# Patient Record
Sex: MALE | Race: WHITE
[De-identification: names, ages, dates, MRNs, and addresses within clinical notes are randomized per-mention and may not be internally consistent; named-entity substitution may affect disease eponyms.]

---

## 2017-02-10 ENCOUNTER — HOSPITAL ENCOUNTER (OUTPATIENT)
Dept: HOSPITAL 39 - LAB.O | Age: 42
Discharge: HOME | End: 2017-02-10
Attending: NURSE PRACTITIONER
Payer: MEDICARE

## 2017-02-10 DIAGNOSIS — R53.1: Primary | ICD-10-CM

## 2017-02-10 DIAGNOSIS — R20.2: ICD-10-CM

## 2017-02-10 NOTE — CT
EXAM DESCRIPTION: 



CT Head



CLINICAL HISTORY: 



TINGLING SENSATION. R20.2



COMPARISON: 



July 1, 2010



TECHNIQUE: 



Contiguous axial images of the brain were obtained without the

administration of intravenous contrast.



FINDINGS: 



There is no acute intracranial hemorrhage or mass effect.

Ventricular system is within normal limits. There is adequate

gray-white matter differentiation. There is no skull fracture.

There is evidence of prior left optic globe surgery. The

visualized paranasal sinuses and mastoid air cells are within

normal limits.



IMPRESSION: 



No acute intracranial abnormalities.



Electronically signed by:  Ozzy Escamilla MD  2/10/2017 4:49 PM

CST

## 2017-03-16 ENCOUNTER — HOSPITAL ENCOUNTER (OUTPATIENT)
Dept: HOSPITAL 39 - CT | Age: 42
Discharge: HOME | End: 2017-03-16
Payer: SELF-PAY

## 2017-03-16 DIAGNOSIS — Z98.890: ICD-10-CM

## 2017-03-16 DIAGNOSIS — L23.0: Primary | ICD-10-CM

## 2017-03-16 DIAGNOSIS — Z87.19: ICD-10-CM

## 2017-03-16 NOTE — CT
Study: CT abdomen and pelvis. 



Indication: POST HERNIA REPAIR



Technique: Venous  phase CT imaging of the abdomen and pelvis

obtained after intravenous administration of contrast.



Comparison: October 1, 2014.



Findings:



Lung bases hyperexpanded. Inferior heart, liver, gallbladder,

pancreas, spleen, adrenal glands, left kidney, bladder, prostate

unremarkable. Small right renal cyst inferiorly.



Mild colonic diverticulosis. Appendix not visualized. Stomach and

small bowel unremarkable. No free fluid. No free air. No

pathologically enlarged abdominal or pelvic lymphadenopathy.

Atherosclerosis aorta. No acute osseous abnormality. 



Tiny fat-containing umbilical hernia. No additional anterior

abdominal wall hernia is identified.



Impression:



Tiny fat-containing umbilical hernia.



Atherosclerosis.



Colonic diverticulosis.



Additional findings as above.



Electronically signed by:  Law Rodarte MD  3/16/2017 12:17 PM

CDT

## 2018-01-09 ENCOUNTER — HOSPITAL ENCOUNTER (INPATIENT)
Dept: HOSPITAL 39 - ER | Age: 43
LOS: 1 days | Discharge: ANOTHER HOSPITAL | DRG: 175 | End: 2018-01-10
Attending: NURSE PRACTITIONER | Admitting: NURSE PRACTITIONER
Payer: MEDICARE

## 2018-01-09 DIAGNOSIS — I26.99: Primary | ICD-10-CM

## 2018-01-09 DIAGNOSIS — D72.829: ICD-10-CM

## 2018-01-09 DIAGNOSIS — Z86.14: ICD-10-CM

## 2018-01-09 DIAGNOSIS — R74.8: ICD-10-CM

## 2018-01-09 DIAGNOSIS — K21.9: ICD-10-CM

## 2018-01-09 DIAGNOSIS — R65.21: ICD-10-CM

## 2018-01-09 DIAGNOSIS — I82.622: ICD-10-CM

## 2018-01-09 DIAGNOSIS — I95.9: ICD-10-CM

## 2018-01-09 DIAGNOSIS — T82.868A: ICD-10-CM

## 2018-01-09 DIAGNOSIS — A41.9: ICD-10-CM

## 2018-01-09 DIAGNOSIS — Z88.3: ICD-10-CM

## 2018-01-09 DIAGNOSIS — N28.9: ICD-10-CM

## 2018-01-09 DIAGNOSIS — Z90.01: ICD-10-CM
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
## 2018-01-09 DIAGNOSIS — F17.210: ICD-10-CM

## 2018-01-09 DIAGNOSIS — T44.7X5A: ICD-10-CM

## 2018-01-09 DIAGNOSIS — I95.2: ICD-10-CM

## 2018-01-09 DIAGNOSIS — Z79.1: ICD-10-CM

## 2018-01-09 DIAGNOSIS — Z86.2: ICD-10-CM
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
## 2018-01-09 DIAGNOSIS — J18.9: ICD-10-CM
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
## 2018-01-09 DIAGNOSIS — Z79.899: ICD-10-CM
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
## 2018-01-09 DIAGNOSIS — I10: ICD-10-CM

## 2018-01-09 PROCEDURE — B32TYZZ COMPUTERIZED TOMOGRAPHY (CT SCAN) OF LEFT PULMONARY ARTERY USING OTHER CONTRAST: ICD-10-PCS

## 2018-01-09 PROCEDURE — B32SYZZ COMPUTERIZED TOMOGRAPHY (CT SCAN) OF RIGHT PULMONARY ARTERY USING OTHER CONTRAST: ICD-10-PCS

## 2018-01-09 RX ADMIN — MORPHINE SULFATE PRN MG: 10 INJECTION INTRAVENOUS at 20:49

## 2018-01-09 RX ADMIN — Medication PRN MLS/HR: at 20:37

## 2018-01-09 NOTE — RAD
EXAM DESCRIPTION: 



Chest,1 View



CLINICAL HISTORY: 



R rib pain



COMPARISON: 



November 1, 2009



IMPRESSION: 



Single AP portable upright view of the chest shows cardiac

silhouette and pulmonary vasculature to be within normal limits.

Lungs are normally aerated and clear.

No obvious pleural effusion or pneumothorax is seen.



Electronically signed by:  Adrián Gill MD  1/9/2018 3:26 PM CST

Workstation: [id number]

## 2018-01-09 NOTE — ED.PDOC
History of Present Illness





- General


Chief Complaint: General


Stated Complaint: Dizziness, sweats, headace, R back/chest discomfor


Time Seen by Provider: 18 15:01


Source: patient, family


Exam Limitations: no limitations





- History of Present Illness


Timing/Duration: 24 hours, constant


Severity: severe


Improving Factors: nothing


Worsening Factors: other - deep breathing


Associated Symptoms: chest pain, fever/chills


Allergies/Adverse Reactions: 


Allergies





Sulfamethoxazole w/Trimethoprim [From Bactrim] Allergy (Verified 18 14:48)


 








Home Medications: 


Ambulatory Orders





ALPRAZolam [Xanax] 0.25 mg PO TID 14 


Lisinopril 20 mg PO BID 14 


amLODIPine BESYLATE [Norvasc] 5 mg PO DAILY 14 


Ibuprofen [Advil] 200 mg PO PRN 10/01/14 


Lansoprazole [Prevacid] 30 mg PO PRN 10/01/14 











Review of Systems





- Review of Systems


Constitutional: States: chills, fever, weakness


EENTM: States: no symptoms reported


Respiratory: Denies: cough, short of breath, wheezing


Cardiology: States: chest pain


Gastrointestinal/Abdominal: States: nausea.  Denies: abdominal pain, vomiting


Genitourinary: States: no symptoms reported


Musculoskeletal: States: no symptoms reported.  Denies: joint pain, muscle pain


Skin: States: no symptoms reported.  Denies: rash


Neurological: States: no symptoms reported, anxiety, weakness


Endocrine: Denies: increased thirst, increased urine


Hematologic/Lymphatic: Denies: blood clots, easy bruising, swollen glands





Past Medical History (General)





- Patient Medical History


Hx Seizures: No


Hx Stroke: No


Hx Dementia: No


Hx Asthma: Yes


Hx of COPD: No


Hx Cardiac Disorders: No


Hx Congestive Heart Failure: No


Hx Pacemaker: No


Hx Hypertension: Yes


Hx Thyroid Disease: No


Hx Diabetes: No


Hx Gastroesophageal Reflux: Yes


Hx Renal Disease: No


Hx Cancer: No


Hx of HIV: No


Hx Hepatitis C: No


Hx MRSA: Yes


MRSA Source:: Wound


Surgical History: appendectomy, other - L eye enucleation, hernia repair





- Vaccination History


Hx Tetanus, Diphtheria Vaccination: Yes - WHILE BACK.


Hx Influenza Vaccination: No


Hx Pneumococcal Vaccination: No





- Social History


Hx Tobacco Use: Yes


Hx Chewing Tobacco Use: No


Hx Alcohol Use: No - NONE SINCE 


Hx Substance Use: No


Hx Substance Use Treatment: No


Hx Depression: No


Hx Physical Abuse: No


Hx Emotional Abuse: No


Hx Suspected Abuse: No





Family Medical History





- Family History


  ** Mother


Family History: Unknown


Living Status: 


Hx Family Cancer: Yes





Physical Exam





- Physical Exam


General Appearance: Alert, Obvious distress


Eye Exam: right normal - L eye is absent


Ears, Nose, Throat: hearing grossly normal, normal ENT inspection, normal 

pharynx


Neck: non-tender, full range of motion, supple


Respiratory: chest non-tender, no respiratory distress, rales - in R base, 

rhonchi


Cardiovascular/Chest: normal peripheral pulses, no murmur, tachycardia





Progress





- EKG/XRAY/CT


CT Ordered: Yes


CT Interpretation Call Back: Yes





Departure





- Departure


Clinical Impression: 


 Pulmonary embolism on right





Time of Disposition: 17:20


Disposition: Admit Patient


Condition: Good


Departure Forms:  ED Discharge - Pt. Copy, Patient Portal Self Enrollment


Referrals: 


Aldo Stearns III, MD [Primary Care Provider] - 1-2 Weeks


Home Medications: 


Ambulatory Orders





ALPRAZolam [Xanax] 0.25 mg PO TID 14 


Lisinopril 20 mg PO BID 14 


amLODIPine BESYLATE [Norvasc] 5 mg PO DAILY 14 


Ibuprofen [Advil] 200 mg PO PRN 10/01/14 


Lansoprazole [Prevacid] 30 mg PO PRN 10/01/14

## 2018-01-09 NOTE — CT
PROCEDURE:  CTA Chest right pleuritic chest pain after PICC line

placement



CLINICAL HISTORY:  42 years  Male  R pleuritic chest pain s/p

PICC line



COMPARISON:  None.



TECHNIQUE: Contiguous axial images were obtained through the

chest during the infusion of IV contrast. Reformatted images

obtained. MIP reformatted images obtained.   



This exam was performed according to our department optimization

program which includes automated exposure control, adjustment of

the mA and/or kv according to patient size and/or use of

iterative reconstruction technique.



FINDINGS:



There is embolus in right lower lobe segmental and subsegmental

branches. Motion artifact limits evaluation. No evidence of

embolus in the main pulmonary artery or the right or left main

branches.



On the thin section images there are central filling defects in

multiple vessels however this appears to be artifactual as this

is in both pulmonary arteries and pulmonary veins. The

possibility of some additional embolus to the right middle lobe

of the left lung base cannot be excluded on the basis of this

exam however



The pulmonary trunk is similar in caliber to the aorta. No

significant reflux of contrast is noted into the IVC.



There is minimal straightening of the interventricular septum,

however this does not appear significantly altered as compared to

the previous examination.



Mild emphysematous change.

Infiltrate in the right lower lobe versus developing area of

infarct.

No additional evidence of embolus is noted.

No significant mediastinal adenopathy.

There is a small enlarged axillary lymph node on the left with

short axis measurement of 1.3 cm.



IMPRESSION: Segmental and subsegmental pulmonary emboli to the

right lower lobe. Dr. Arreaga was called and notified of the

findings at 5:02 PM Central time.



There is adjacent wedge-shaped infiltrate versus developing

infarct in the right lung base



Additional images are limited secondary to motion artifact. There

are some central filling defects on the thin section images but

this is also noted in the pulmonary veins suggesting artifact.

The possibility of additional areas of embolus to the right

middle lobe or left lung base is not excluded however



Minimal straightening of the interventricular septum which

appears similar to the previous study. No overt signs of right

heart strain are noted



Enlarged hilar lymph node on the left. Findings are of uncertain

clinical significance. Recommend follow-up







Electronically signed by:  Doretha Johnson  1/9/2018 5:27 PM CST

Workstation: [id number]
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
## 2018-01-10 VITALS — SYSTOLIC BLOOD PRESSURE: 91 MMHG | DIASTOLIC BLOOD PRESSURE: 62 MMHG | TEMPERATURE: 97 F

## 2018-01-10 VITALS — OXYGEN SATURATION: 97 %

## 2018-01-10 RX ADMIN — ENOXAPARIN SODIUM SCH MG: 100 INJECTION, SOLUTION INTRAVENOUS; SUBCUTANEOUS at 17:05

## 2018-01-10 RX ADMIN — Medication PRN MLS/HR: at 15:54

## 2018-01-10 RX ADMIN — ENOXAPARIN SODIUM SCH MG: 100 INJECTION, SOLUTION INTRAVENOUS; SUBCUTANEOUS at 05:31

## 2018-01-10 RX ADMIN — MORPHINE SULFATE PRN MG: 10 INJECTION INTRAVENOUS at 00:28

## 2018-01-10 RX ADMIN — Medication PRN MLS/HR: at 05:33

## 2018-01-10 NOTE — US
EXAM DESCRIPTION: 



Venous,Lower Extremity LT



CLINICAL HISTORY: 



PE, find source of embolism



COMPARISON: 



None Available.



TECHNIQUE: 



Left lower extremity venous grayscale, spectral, and color

Doppler sonographic images. 



FINDINGS: 



There is no DVT identified.

There is normal color flow observed with good flow augmentation.

All deep veins compress normally.





IMPRESSION: 



Negative for DVT



Electronically signed by:  Chapo Irene MD  1/10/2018 3:39 PM CST

Workstation: [id number]

## 2018-01-10 NOTE — US
EXAM DESCRIPTION: 



Venous,Upper Extremity LT



CLINICAL HISTORY: 



PE, find source of embolism



COMPARISON: 



None Available.



TECHNIQUE: 



Left upper extremity venous grayscale, spectral, and color

Doppler sonographic images. 



FINDINGS: 



Nonocclusive deep venous thrombosis is demonstrated in the left

basilic vein. The internal jugular, subclavian, radial, brachial,

and ulnar veins demonstrate normal flow with no evidence of DVT.



IMPRESSION: 



DVT in the left basilic vein in this patient with known pulmonary

emboli.



Electronically signed by:  Chapo Irene MD  1/10/2018 2:48 PM CST

Workstation: [id number]

## 2018-01-10 NOTE — US
EXAM DESCRIPTION: 



Venous,Lower Extremity LT



CLINICAL HISTORY: 



PE, find source of embolism



COMPARISON: 



None Available.



TECHNIQUE: 



Left lower extremity venous grayscale, spectral, and color

Doppler sonographic images. 



FINDINGS: 



There is no DVT identified.

There is normal color flow observed with good flow augmentation.

All deep veins compress normally.





IMPRESSION: 



Negative for DVT



Electronically signed by:  Chapo Irene MD  1/10/2018 3:39 PM CST

Workstation: [id number]

## 2018-01-10 NOTE — PN
SUPERVISING PHYSICIAN:  Jese Christianson MD



DATE:  01/10/18



SUBJECTIVE: This is a 42-year-old male who was admitted last night with a 
pulmonary embolism.  Overnight, I was called due to the fact that he had 
significant tachycardia with pleuritic chest pain.  I did order an EKG as well 
as cardiac enzymes.  His troponin was negative and EKG showed sinus 
tachycardia.  However, there was some ST elevation in the inferolateral leads.  
He was not having substernal chest pain.  His pain was pleuritic in nature.  I 
did give him some morphine which alleviated some of the pain and also gave him 
2.5 mg of IV Lopressor which helped with his heart rate.  We repeated the 
troponin a couple of hours later and it continued to be negative.  So far, his 
oxygenation has been acceptable along with his blood pressure.  He is not 
showing any kind of hemodynamic instability at this time.  He states the pain 
is temporarily improved with morphine, but it does not sustain.



OBJECTIVE:  VITAL SIGNS:  Blood pressure 143/87.  Heart rate 137.  Respiratory 
rate 24.  Temperature 96.8.  Oxygen saturation 96%.

GENERAL:  Mr. Gillette is a 42-year-old male patient who is in moderate distress 
secondary to pain.

HEENT:  Normocephalic, atraumatic.  The right Pupil is reactive.  The left eye 
is enucleated.  No nasal drainage.  Moist buccal mucosa.

NECK: Supple.  Midline trachea.  No jugular venous distention.

CHEST:  Symmetrical with equal rise and fall of the chest with inspiration and 
expiration.  Lungs sounds are a little bit diminished in the bases, but 
otherwise clear to auscultation bilaterally.  

CARDIOVASCULAR:  Regular rate and rhythm, which is sinus tachycardia per the 
cardiac otherwise normal.  

ABDOMEN:  Soft.  Positive bowel sounds. 

GENITOURINARY:  Deferred.

EXTREMITIES: Lower extremities with no significant edema.  

NEUROLOGIC:  The patient is alert and oriented.



LABORATORY:  Labs are reviewed.  He has had an improvement in his white blood 
cell count at 12.6.  Hemoglobin 14.4, hematocrit 42.9, platelet count 137.  PT 
14.3, INR 1.27, PT-T 33.6.  Sodium 129, potassium 4.1, chloride 96, CO2 23, BUN 
10, creatinine 0.95, glucose 122, calcium 8.3.  Cardiac enzymes drawn this 
morning are negative with troponin less than 0.02.  



ASSESSMENT: 

1.  Acute pulmonary embolism.

2.  Leukocytosis.

3.  Tachycardia with ST elevation.

4.  Hypertension.

5.  Recent Staphylococcus aureus infection requiring IV antibiotics.

6.  History of thrombotic thrombocytopenic purpura (TTP).



PLAN:

1.  Regarding the pulmonary embolism, I am continuing the Lovenox at this time.
  I 

     have spoken with several specialists in [place], the first being Dr. Casey, 

     pulmonologist.  I discussed the case with him and he stated that as long 
as the 

     patient is hemodynamically stable and is not showing a significant 
decrease in 

     his oxygenation, he should be fine on the current management to include 
Lovenox.  

     I also spoke with the cardiologist, Dr. Hargrove.  I sent him the EKG which 
was 

     showing ST elevation.  I also informed him that the cardiac enzymes were 
negative.  

     He agreed that the patient was not likely having a myocardial infarction 
and there 

     were no reciprocal changes on EKG.  He was in agreement that if the patient
's 

     blood pressure could tolerate it, that a beta blocker could be given.  At 
that time, I 

     did order 2.5 mg of IV Lopressor again.  If his blood pressure maintains, 
I will likely 

     start him on p.o. Lopressor.  I also spoke with Dr. Dugan with 
hematology.  She 

     has seen him in the past for his TTP.  I discussed the case with her and 
she was in 

     agreement that Lovenox could be utilized and that he could be transitioned 
to either 

     Eliquis or Xarelto.  I have gone over to the clinic to get a starter pack 
for Xarelto and 

     we will likely start this tomorrow hoping that over the next 24 hours his 
heart rate and 

     pain control is better.  

2.  Leukocytosis is improved, but still there.  He did have that area on CT 
which was 

     either infarct or infiltrate.  Therefore, I will empirically place him on 
cefepime.  

3.  Regarding the tachycardia with ST elevation, as discussed above with Dr. Hargrove.  

     This is not a myocardial infarction and we will treat this with beta 
blockers and hopefully 

     his heart rate will improve with pain control as well as the beta blockers.

4.  The morphine is not adequately controlling his pain.  Therefore, I am going 
to change 

     this to Dilaudid.  I did test him with 1 mg of Dilaudid and it did have 
significantly better 

     results.

5.  I have already resumed his home medications as well.  



Dr. Christianson is the collaborating physician.



#[id number]
[facility]

## 2018-01-10 NOTE — PN
DATE:  01/10/18



CRITICAL CARE PROGRESS NOTE - START 1850, END TIME 2000



COLLABORATING PHYSICIAN:  Jese Christianson M.D.



SUBJECTIVE:  I was called to the patient's bedside due to hypotension.  The 
patient was diaphoretic with a systolic blood pressure of 78.  Upon arrival in 
the room, the patient is alert but clearly pale and not feeling well.  Heart 
rate is in the 80s.  He did receive Metoprolol about an hour and a half ago.  
He also spiked a fever of 102.4 at around 2:00 PM.  At that point he had blood 
cultures drawn  and I had already initiated Cefipime.  I ordered a bolus of 
lactated ringers stat at this time.  I discussed with him that he probably 
needed a transfer to [facility] due to the fact that his hypotension could 
be due to multiple things or multifactorial.  Given the fact that he ran a 
fever earlier, there is a concern for sepsis.  Additionally, his hypotension 
could be secondary to his pulmonary embolism.  There is the possibility also 
that he is sensitive to the Metoprolol that he got, however, he got IV doses of 
Metoprolol earlier without this dramatic of an effect.  I called [facility] in [place] for transfer.  I spoke with Dr. Mendoza and he is 
willing to accept Mr. Gillette at this time. With about 50% of the bolus infused
, his systolic is in the mid 80s.  I am ordering vasopressors if there is no 
canchola improvement in the blood pressure at this time.



OBJECTIVE:  Blood pressure 82/50, heart rate 82, respiratory rate 26, 
temperature now 96.4.  O2 saturation is 97%.  GENERAL:  Mr. Gillette is an 
obviously acutely ill male patient in moderate distress at this time.  HEENT: 
Head is normocephalic and atraumatic.  EYES: The right eye is reactive, the 
left is enucleated.  NOSE: No drainage.  THROAT: With moist mucosa.  NECK: 
Supple with midline trachea.  No jugular venous distention.  CHEST: Symmetrical 
with equal rise and fall of the chest with inspiration and expiration.  Lung 
sounds are a little bit diminished in the bases, but otherwise clear to 
auscultation bilaterally.  CARDIOVASCULAR: The patient has a regular rate and 
rhythm, normal S1 and S2.  ABDOMEN: Soft, positive bowel sounds.  Non-tender to 
palpation.  GENITOURINARY: Exam was deferred.  EXTREMITIES:  Lower extremities 
with no edema.  Pulses are about 1+.  Capillary refill is about 3 seconds.  
NEUROLOGIC: The patient is alert and oriented.



LABORATORY:  Labs and films are as stated earlier in the Progress Note done 
earlier today.  He did have an elevated white count of 12,600.  He had a left 
shift of 88.1.  Sodium 129, potassium 4.1, chloride 96, carbon dioxide 23, BUN 
10, creatinine 0.95, glucose 122, calcium 8.3.  Cardiac enzymes were negative.  
Cultures are still pending.  There are no positive results on that as of yet.



ASSESSMENT: 

1.   Symptomatic hypotension with concern for septic shock versus complication 
from

      a pulmonary embolism versus iatrogenic from the Metoprolol.

2.   Acute pulmonary embolism.

3.   Leukocytosis.

4.   Tachycardia with ST elevation with myocardial infarction ruled out.

5.   Hypertension history with current hypotension.

6.   Recent Staphylococcus aureus skin infection that he completed IV 
antibiotics for.

7.   Left basilic vein deep venous thrombosis secondary to a PICC line.

8.   History of TTP.



PLAN:

1.   As day before, we are bolusing fluids and treating as sepsis until proven 
otherwise.  

      I have arranged to transfer to [facility] under Dr. Mendoza.  I 
have been 

      at the bedside for 70 minutes with direct observation as well as giving 
orders and

      coordinating care to [facility].  Family is at the bedside, too, and 
have 

      been updated.  I am also going to order vasopressors in the event that IV 
bolus

      fluids are ineffective.



Critical Care time is 70 minutes spent in evaluation and management of the 
patient, coordination of care with the nursing staff as well as the [facility] physicians, chart review, , as well as documentation.



#[id number]
[facility]

## 2018-01-11 NOTE — DS
SUPERVISING PHYSICIAN:  Jese Christianson MD



DISCHARGE/TRANSFER SUMMARY



ADMISSION DIAGNOSIS:

1.  Acute pulmonary embolism.

2.  Leukocytosis.

3.  Tachycardia.

4.  History of hypertension.

5.  Recent Staphylococcus aureus infection requiring IV antibiotics.

6.  History of thrombotic thrombocytopenic purpura (TTP).



DISCHARGE DIAGNOSIS: 

1.  Acute pulmonary embolism.

2.  Symptomatic hypotension.

3.  Leukocytosis with possible right lower lobe pneumonia.

4.  Tachycardia with ST elevation, however, myocardial infarction has been

     ruled out.

5.  Recent Staphylococcus aureus infection requiring outpatient IV antibiotics.

6.  History of thrombotic thrombocytopenic purpura (TTP).



HOSPITAL COURSE: This is a 42-year-old male patient who came to the Emergency 
Room with dizziness and sweats, but mainly right sided pleuritic chest pain on 
the evening of 01/09/18.  He stated it had been going on for about 24 hours 
after a PICC line had been removed from his arm the day before.  The pain 
started about 30 minutes after the PICC line was taken out.  He came to the 
Emergency Room and had labs and films done including a CT angiogram of the 
chest which showed a pulmonary embolism in the right lower lobe segment and 
subsegmental branches.  There was no evidence of a pulmonary embolus in the 
main pulmonary arteries, however, adjacent to the embolism, there was a wedge-
shaped area which was interpreted as either infiltrate versus infarct.  
Additionally, he had an elevated white blood cell count of 14,000.  For these 
reasons, he was admitted to the hospital and placed on Lovenox.  At the time, 
his systolic blood pressure was acceptable along with his heart rate, which was 
initially in the 130s, but then improved to 112.  O2 saturations were 97% on 
room air.  Later on in the evening, the patient developed a significant right 
sided chest pain where he was actually complaining before along with a 
significant tachycardia.  The monitor was showing ST elevation.  This was early 
morning of 01/10/18.  I went to evaluate the patient and he was complaining of 
pleuritic type chest pain, no substernal chest pain.  He did have some 
shortness of breath.  Heart rate was greatly elevated at about 139 or 140.  I 
did a 12-lead EKG, which did show ST elevation in inferior and anterior leads.  
However, the troponin that was done was negative.  We repeated the troponin a 
couple of hours later and it was still negative.  His heart rate improved after 
2.5 mg of IV Lopressor and morphine was administered for pain control.  A third 
set of cardiac enzymes was drawn later on in the morning and it was negative as 
well.  On morning rounds, I did fax the EKG to Dr. Hargrove in [place] who 
agreed that this is not a myocardial infarction despite the fact that the 
electronic interpretation was saying it was and there were no reciprocal 
changes to suggest that the ST elevation that was showing up was secondary to 
myocardial infarction, plus the troponin was negative.  I also spoke with Dr. Casey in [place] regarding management of the pulmonary embolism and he 
was in agreement that Lovenox was sufficient given the patient had a normal 
blood pressure and O2 saturations were acceptable.  The last person I called 
was Dr. Dugan and I spoke with her because he does have a history of TTP.
  She agreed that we could continue the Lovenox and even transfer the patient 
over to Eliquis or Xarelto.  I kept him on the Lovenox for the day and then 
obtained a starer pack for Xarelto for him to start tomorrow.  I also 
empirically placed him on cefepime due to the fact I could not rule out that he 
did have a pneumonia although it was likely more of an infarct from the 
pulmonary embolism.  I ordered sonograms of the bilateral lower extremities as 
well as the left upper extremity.  The lower extremities did not show any DVT, 
however, the left upper extremity showed a left basilic vein DVT.  This was 
consistent with where his PICC line was.  About 2 o'clock in the afternoon, the 
patient developed a fever at 102.4.  At that time, I drew blood cultures on the 
patient and administered some Tylenol . The Tylenol did not really help with 
his fever and later on, I was called back regarding that.  At that point, I 
gave him 400 mg of Motrin.  Earlier in the morning when I did speak with Dr. Hargrove about his heart rate, he said he was not opposed to utilizing metoprolol 
to control his heart rate.  I gave him another dose of 2.5 mg of IV Lopressor 
and his heart rate did improve a bit.  At that point, I did order some p.o. 
Lopressor to be given b.i.d. 25 mg.  The Lopressor was given about 5 o'clock in 
the afternoon.  His fever did get better.  However, I was called about 6:50 
informing me that the patient was hypotensive with systolic blood pressure of 
78 and the patient was diaphoretic.  I went to the bedside to evaluate the 
patient and he was diaphoretic and did have a systolic blood pressure in the 
70s.  I ordered a stat bolus of Lactated Ringers at that time.  His heart rate 
was a little bit better and was in the 80s.  With the administration of IV 
fluids, his blood pressure did improve to the 90s, however, at this point, the 
blood pressure being low had multiple potential causes.  There is a concern for 
sepsis due to the fever and leukocytosis.  He does have a history of MRSA skin 
infections as well as the potential for the right lower lobe pneumonia.  
Additionally, his pulmonary embolism could be causing hemodynamic instability 
and, finally, it could be iatrogenic from the Lopressor that was given.  However
, he did not have a dramatic effect from the IV Lopressor earlier in the day.  
At that time, I spoke with Dr. Christianson and he was in agreement that we should 
attempt to transfer the patient to [place].  I spoke with Dr. Mendoza 
regarding transferring the patient.  He was in agreement to accept the patient 
to be treated for severe sepsis and septic shock until ruled out otherwise.  I 
ordered a total of 2 liters of crystalloid fluids and also ordered 
norepinephrine to be started if the blood pressure did not improve.  The 
norepinephrine was not started prior to his departure in the ambulance, however
, I did give the norepinephrine to the ambulance staff so that they could 
utilize it if it was necessary.  I also spoke with Dr. Lainez at [facility] Emergency Room.  The patient was to stop there first 
and be evaluated prior to going up to one of the floors, either the ICU or PCU.
  This will depend on probably blood pressure at the time.  Therefore, the 
patient departed [facility] in critical condition.  Further 
care and treatment to be administered by [facility] team.



#[id number]
[facility]

## 2018-02-23 ENCOUNTER — HOSPITAL ENCOUNTER (OUTPATIENT)
Dept: HOSPITAL 39 - LAB.O | Age: 43
End: 2018-02-23
Attending: INTERNAL MEDICINE
Payer: MEDICARE

## 2018-02-23 DIAGNOSIS — D69.3: Primary | ICD-10-CM

## 2018-02-26 ENCOUNTER — HOSPITAL ENCOUNTER (OUTPATIENT)
Dept: HOSPITAL 39 - LAB.O | Age: 43
End: 2018-02-26
Attending: INTERNAL MEDICINE
Payer: MEDICARE

## 2018-02-26 DIAGNOSIS — D69.3: Primary | ICD-10-CM

## 2018-04-07 ENCOUNTER — HOSPITAL ENCOUNTER (OUTPATIENT)
Dept: HOSPITAL 39 - GOCC | Age: 43
Discharge: HOME | End: 2018-04-07
Attending: INTERNAL MEDICINE
Payer: MEDICARE

## 2018-04-07 DIAGNOSIS — R78.81: Primary | ICD-10-CM

## 2018-04-08 ENCOUNTER — HOSPITAL ENCOUNTER (OUTPATIENT)
Dept: HOSPITAL 39 - GOCC | Age: 43
Discharge: HOME | End: 2018-04-08
Attending: INTERNAL MEDICINE
Payer: MEDICARE

## 2018-04-08 DIAGNOSIS — R78.81: Primary | ICD-10-CM

## 2018-04-12 ENCOUNTER — HOSPITAL ENCOUNTER (OUTPATIENT)
Dept: HOSPITAL 39 - LAB.O | Age: 43
End: 2018-04-12
Attending: INTERNAL MEDICINE
Payer: MEDICARE

## 2018-04-12 DIAGNOSIS — D69.3: Primary | ICD-10-CM

## 2018-09-17 ENCOUNTER — HOSPITAL ENCOUNTER (OUTPATIENT)
Dept: HOSPITAL 39 - LAB.O | Age: 43
End: 2018-09-17
Attending: INTERNAL MEDICINE
Payer: MEDICARE

## 2018-09-17 DIAGNOSIS — B95.62: ICD-10-CM

## 2018-09-17 DIAGNOSIS — L03.211: ICD-10-CM

## 2018-09-17 DIAGNOSIS — R89.5: Primary | ICD-10-CM

## 2018-09-19 ENCOUNTER — HOSPITAL ENCOUNTER (OUTPATIENT)
Dept: HOSPITAL 39 - LAB.O | Age: 43
End: 2018-09-19
Attending: INTERNAL MEDICINE
Payer: MEDICARE

## 2018-09-19 DIAGNOSIS — R89.5: Primary | ICD-10-CM

## 2018-09-19 DIAGNOSIS — B95.62: ICD-10-CM

## 2018-09-19 DIAGNOSIS — L03.211: ICD-10-CM

## 2018-09-21 ENCOUNTER — HOSPITAL ENCOUNTER (OUTPATIENT)
Dept: HOSPITAL 39 - LAB.O | Age: 43
End: 2018-09-21
Attending: INTERNAL MEDICINE
Payer: MEDICARE

## 2018-09-21 DIAGNOSIS — R89.5: Primary | ICD-10-CM

## 2018-09-21 DIAGNOSIS — B95.62: ICD-10-CM

## 2018-09-21 DIAGNOSIS — L03.211: ICD-10-CM

## 2018-09-24 ENCOUNTER — HOSPITAL ENCOUNTER (OUTPATIENT)
Dept: HOSPITAL 39 - LAB | Age: 43
End: 2018-09-24
Attending: INTERNAL MEDICINE
Payer: MEDICARE

## 2018-09-24 DIAGNOSIS — B95.62: ICD-10-CM

## 2018-09-24 DIAGNOSIS — R89.5: Primary | ICD-10-CM

## 2018-09-24 DIAGNOSIS — L03.211: ICD-10-CM

## 2018-09-26 ENCOUNTER — HOSPITAL ENCOUNTER (OUTPATIENT)
Dept: HOSPITAL 39 - LAB.O | Age: 43
End: 2018-09-26
Attending: INTERNAL MEDICINE
Payer: MEDICARE

## 2018-09-26 DIAGNOSIS — B95.62: ICD-10-CM

## 2018-09-26 DIAGNOSIS — L03.211: ICD-10-CM

## 2018-09-26 DIAGNOSIS — R89.5: Primary | ICD-10-CM

## 2018-10-01 ENCOUNTER — HOSPITAL ENCOUNTER (OUTPATIENT)
Dept: HOSPITAL 39 - LAB.O | Age: 43
End: 2018-10-01
Attending: INTERNAL MEDICINE
Payer: MEDICARE

## 2018-10-01 DIAGNOSIS — L02.213: Primary | ICD-10-CM

## 2018-10-03 ENCOUNTER — HOSPITAL ENCOUNTER (OUTPATIENT)
Dept: HOSPITAL 39 - LAB.O | Age: 43
End: 2018-10-03
Attending: INTERNAL MEDICINE
Payer: MEDICARE

## 2018-10-03 DIAGNOSIS — L02.213: Primary | ICD-10-CM

## 2018-10-22 ENCOUNTER — HOSPITAL ENCOUNTER (OUTPATIENT)
Dept: HOSPITAL 39 - LAB.O | Age: 43
End: 2018-10-22
Attending: INTERNAL MEDICINE
Payer: MEDICARE

## 2018-10-22 DIAGNOSIS — L02.213: Primary | ICD-10-CM

## 2018-10-22 DIAGNOSIS — L02.92: ICD-10-CM

## 2018-10-31 ENCOUNTER — HOSPITAL ENCOUNTER (OUTPATIENT)
Dept: HOSPITAL 39 - LAB.O | Age: 43
End: 2018-10-31
Attending: INTERNAL MEDICINE
Payer: MEDICARE

## 2018-10-31 DIAGNOSIS — L02.13: Primary | ICD-10-CM

## 2018-10-31 DIAGNOSIS — L02.92: ICD-10-CM

## 2019-01-10 ENCOUNTER — HOSPITAL ENCOUNTER (OUTPATIENT)
Dept: HOSPITAL 39 - ER | Age: 44
Setting detail: OBSERVATION
LOS: 2 days | Discharge: HOME | End: 2019-01-12
Attending: NURSE PRACTITIONER | Admitting: NURSE PRACTITIONER
Payer: MEDICARE

## 2019-01-10 DIAGNOSIS — J45.909: ICD-10-CM

## 2019-01-10 DIAGNOSIS — F17.220: ICD-10-CM

## 2019-01-10 DIAGNOSIS — E87.2: ICD-10-CM

## 2019-01-10 DIAGNOSIS — Z86.73: ICD-10-CM

## 2019-01-10 DIAGNOSIS — Z79.899: ICD-10-CM

## 2019-01-10 DIAGNOSIS — L03.113: Primary | ICD-10-CM

## 2019-01-10 DIAGNOSIS — M79.621: ICD-10-CM

## 2019-01-10 DIAGNOSIS — Z88.6: ICD-10-CM

## 2019-01-10 DIAGNOSIS — E83.42: ICD-10-CM

## 2019-01-10 DIAGNOSIS — K21.9: ICD-10-CM

## 2019-01-10 DIAGNOSIS — R60.0: ICD-10-CM

## 2019-01-10 DIAGNOSIS — I48.0: ICD-10-CM

## 2019-01-10 DIAGNOSIS — Z88.8: ICD-10-CM

## 2019-01-10 DIAGNOSIS — I10: ICD-10-CM

## 2019-01-10 DIAGNOSIS — Z88.2: ICD-10-CM

## 2019-01-10 DIAGNOSIS — D69.3: ICD-10-CM

## 2019-01-10 DIAGNOSIS — Z86.14: ICD-10-CM

## 2019-01-10 DIAGNOSIS — Z88.1: ICD-10-CM

## 2019-01-10 DIAGNOSIS — Z90.01: ICD-10-CM

## 2019-01-10 DIAGNOSIS — F41.1: ICD-10-CM

## 2019-01-10 DIAGNOSIS — Z23: ICD-10-CM

## 2019-01-10 PROCEDURE — 86140 C-REACTIVE PROTEIN: CPT

## 2019-01-10 PROCEDURE — 85025 COMPLETE CBC W/AUTO DIFF WBC: CPT

## 2019-01-10 PROCEDURE — 96365 THER/PROPH/DIAG IV INF INIT: CPT

## 2019-01-10 PROCEDURE — 80053 COMPREHEN METABOLIC PANEL: CPT

## 2019-01-10 PROCEDURE — 99284 EMERGENCY DEPT VISIT MOD MDM: CPT

## 2019-01-10 PROCEDURE — 90714 TD VACC NO PRESV 7 YRS+ IM: CPT

## 2019-01-10 PROCEDURE — 87040 BLOOD CULTURE FOR BACTERIA: CPT

## 2019-01-10 PROCEDURE — 96367 TX/PROPH/DG ADDL SEQ IV INF: CPT

## 2019-01-10 PROCEDURE — 36415 COLL VENOUS BLD VENIPUNCTURE: CPT

## 2019-01-10 PROCEDURE — 80202 ASSAY OF VANCOMYCIN: CPT

## 2019-01-10 PROCEDURE — 83735 ASSAY OF MAGNESIUM: CPT

## 2019-01-10 PROCEDURE — 96366 THER/PROPH/DIAG IV INF ADDON: CPT

## 2019-01-10 PROCEDURE — 94640 AIRWAY INHALATION TREATMENT: CPT

## 2019-01-10 PROCEDURE — 96375 TX/PRO/DX INJ NEW DRUG ADDON: CPT

## 2019-01-10 PROCEDURE — 96361 HYDRATE IV INFUSION ADD-ON: CPT

## 2019-01-10 PROCEDURE — 94760 N-INVAS EAR/PLS OXIMETRY 1: CPT

## 2019-01-10 PROCEDURE — 99406 BEHAV CHNG SMOKING 3-10 MIN: CPT

## 2019-01-10 PROCEDURE — 90471 IMMUNIZATION ADMIN: CPT

## 2019-01-10 PROCEDURE — 96376 TX/PRO/DX INJ SAME DRUG ADON: CPT

## 2019-01-10 PROCEDURE — 85651 RBC SED RATE NONAUTOMATED: CPT

## 2019-01-10 PROCEDURE — 83605 ASSAY OF LACTIC ACID: CPT

## 2019-01-10 PROCEDURE — 76881 US COMPL JOINT R-T W/IMG: CPT

## 2019-01-10 PROCEDURE — 96372 THER/PROPH/DIAG INJ SC/IM: CPT

## 2019-01-10 RX ADMIN — VANCOMYCIN HYDROCHLORIDE SCH MLS/HR: 500 INJECTION, POWDER, LYOPHILIZED, FOR SOLUTION INTRAVENOUS at 20:25

## 2019-01-10 RX ADMIN — ENOXAPARIN SODIUM SCH MG: 40 INJECTION, SOLUTION INTRAVENOUS; SUBCUTANEOUS at 20:32

## 2019-01-10 RX ADMIN — HYDROCODONE BITARTRATE AND ACETAMINOPHEN PRN EA: 5; 325 TABLET ORAL at 20:33

## 2019-01-10 RX ADMIN — SODIUM CHLORIDE AND POTASSIUM CHLORIDE PRN MLS/HR: 4.5; 1.49 INJECTION, SOLUTION INTRAVENOUS at 16:06

## 2019-01-10 RX ADMIN — LISINOPRIL SCH MG: 10 TABLET ORAL at 20:32

## 2019-01-10 NOTE — ED.PDOC
History of Present Illness





- General


Chief Complaint: Skin/Abrasion/Tear


Stated Complaint: R forearm/elbow discomfort


Time Seen by Provider: 01/10/19 11:20


Source: patient


Exam Limitations: no limitations





- History of Present Illness


Initial Comments: 





the patient's a 43-year-old  male presenting to the emergency room 

secondary to cellulitis of the right upper extremity starting last night.  No 

fevers.  No altered mental status.  No shortness of breath.  He does have a 

history of thrombotic cytopenic purpura.  He has seen infectious disease on 

multiple occasions secondary to multiple recurrent staph infections.  He 

normally receives IV vancomycin for the staph infections.  He does have a 

history of becoming septic very rapidly.  No evidence of sepsis currently. He is

pleasant and cooperative.


Timing/Duration: 24 hours


Severity: mild


Improving Factors: nothing


Worsening Factors: nothing


Associated Symptoms: denies symptoms


Allergies/Adverse Reactions: 


Allergies





Codeine Allergy (Unknown, Verified 18 14:50)


   


Meperidine Allergy (Unknown, Verified 18 14:50)


   


Pravastatin [From Pravachol] Allergy (Unknown, Verified 18 14:50)


   


Pregabalin [From Lyrica] Allergy (Unknown, Verified 18 14:50)


   


Ciprofloxacin [From Cipro] Allergy (Verified 01/10/18 04:15)


   


Daptomycin Allergy (Verified 01/10/18 04:15)


   


Sulfamethoxazole w/Trimethoprim [From Bactrim] Allergy (Verified 18 14:48)


   








Home Medications: 


Ambulatory Orders





ALPRAZolam [Xanax] 0.25 mg PO TID 14 


Lisinopril 20 mg PO BID 14 


amLODIPine BESYLATE [Norvasc] 5 mg PO DAILY 14 


Ibuprofen [Advil] 200 mg PO PRN 10/01/14 


Lansoprazole [Prevacid] 30 mg PO PRN 10/01/14 











Review of Systems





- Review of Systems


Constitutional: States: no symptoms reported


EENTM: States: no symptoms reported


Respiratory: States: no symptoms reported


Cardiology: States: no symptoms reported


Gastrointestinal/Abdominal: States: no symptoms reported


Genitourinary: States: no symptoms reported


Musculoskeletal: States: no symptoms reported


Skin: States: see HPI


Neurological: States: no symptoms reported


Endocrine: States: no symptoms reported


All other Systems: No Change from Baseline





Past Medical History (General)





- Patient Medical History


Hx Seizures: No


Hx Stroke: Yes - 


Hx Dementia: No


Hx Asthma: Yes


Hx of COPD: No


Hx Cardiac Disorders: No


Hx Congestive Heart Failure: No


Hx Pacemaker: No


Hx Hypertension: Yes


Hx Thyroid Disease: No


Hx Diabetes: No


Hx Gastroesophageal Reflux: Yes


Hx Renal Disease: No


Hx Cancer: Yes - ITP


Hx of HIV: No


Hx Hepatitis C: No


Hx MRSA: Yes - est 


MRSA Source:: Wound


Surgical History: appendectomy





- Vaccination History


Hx Tetanus, Diphtheria Vaccination: Yes - WHILE BACK.


Hx Influenza Vaccination: No


Hx Pneumococcal Vaccination: No





- Social History


Hx Tobacco Use: Yes


Hx Chewing Tobacco Use: No


Hx Alcohol Use: No


Hx Substance Use: No


Hx Substance Use Treatment: No


Hx Depression: No


Hx Physical Abuse: No


Hx Emotional Abuse: No


Hx Suspected Abuse: No





Family Medical History





- Family History


  ** Father


Living Status: 


Hx Family;Other: MI





  ** Mother


Family History: Unknown


Living Status: Still Living


Hx Family Cancer: Yes - Breast cancer





Physical Exam





- Physical Exam


General Appearance: Alert, Comfortable, No apparent distress


Eye Exam: bilateral other - the patient does have a disconjugate gaze


Ears, Nose, Throat: hearing grossly normal, normal pharynx - poor dentition


Neck: full range of motion, supple


Respiratory: lungs clear, normal breath sounds, no respiratory distress, no 

accessory muscle use


Cardiovascular/Chest: normal peripheral pulses, no edema, other - mild 

tachycardia


Peripheral Pulses: radial,right: 2+, radial,left: 2+


Gastrointestinal/Abdominal: non tender, soft, other - [id number]  is in the 

left upper chest wall


Rectal Exam: deferred


Back Exam: no CVA tenderness, no vertebral tenderness


Extremity: non-tender, no pedal edema, normal capillary refill, other - welling 

of the right forearm with associated erythema but no obvious abscess formation.


Neurologic: CNs II-XII nml as tested, alert, normal mood/affect, oriented x 3


Skin Exam: normal color - cellulitis as above to the right forearm


Comments: 





                               Vital Signs - 8 hr











  01/10/19





  11:04


 


Temperature 98.3 F


 


Pulse Rate [ 110 H





Left Radial] 


 


Respiratory 20





Rate 


 


Blood Pressure 135/87





[Left Arm] 


 


O2 Sat by Pulse 99





Oximetry 














Progress





- Progress


Progress: 





01/10/19 12:58


the patient is a 43-year-old  male with a history of TTP presenting to 

the emergency room secondary to cellulitis of the right upper extremity.  He has

historically had multiple recurrent staph infections requiring IV vancomycin and

has developed sepsis rapidly in the past.  For this reason the patient is going 

to be started on vancomycin here in the emergency room and continued under 

observation tonight to make sure that he does not develop sepsis.  Blood 

cultures have been performed.  Admit for further monitoring.the patient does 

functionally have an immunocompromise state based upon his history.


01/10/19 12:59








- Results/Orders


Results/Orders: 





                                Laboratory Tests











  01/10/19 01/10/19 01/10/19





  11:47 11:47 11:47


 


WBC  10.5  


 


RBC  5.26  


 


Hgb  15.4  


 


Hct  46.2  


 


MCV  87.8  


 


MCH  29.3  


 


MCHC  33.4  


 


RDW  12.7  


 


Plt Count  200  


 


MPV  8.1  


 


Absolute Neuts (auto)  7.80 H  


 


Absolute Lymphs (auto)  1.60  


 


Absolute Monos (auto)  0.90 H  


 


Absolute Eos (auto)  0.10  


 


Absolute Basos (auto)  0.00  


 


Neutrophils %  74.4  


 


Lymphocytes %  15.4 L  


 


Monocytes %  8.8  


 


Eosinophils %  1.1  


 


Basophils %  0.3  


 


Sodium   136 


 


Potassium   3.6 


 


Chloride   103 


 


Carbon Dioxide   25 


 


Anion Gap   11.6 L 


 


BUN   19 H 


 


Creatinine   0.75 


 


BUN/Creatinine Ratio   25.3 H 


 


Random Glucose   109 H 


 


Serum Osmolality   274.8 L 


 


Lactic Acid    1.3


 


Calcium   9.5 


 


Total Bilirubin   0.8 


 


AST   24 


 


ALT   26 


 


Alkaline Phosphatase   125 H 


 


Serum Total Protein   8.2 


 


Albumin   4.3 


 


Globulin   3.9 H 


 


Albumin/Globulin Ratio   1.1 














Departure





- Departure


Clinical Impression: 


 Cellulitis of arm, right, Immunocompromised state





Disposition: Admit Patient


Referrals: 


Aldo Stearns III, MD [Primary Care Provider] - 1-2 Weeks


Home Medications: 


Ambulatory Orders





ALPRAZolam [Xanax] 0.25 mg PO TID 14 


Lisinopril 20 mg PO BID 14 


amLODIPine BESYLATE [Norvasc] 5 mg PO DAILY 14 


Ibuprofen [Advil] 200 mg PO PRN 10/01/14 


Lansoprazole [Prevacid] 30 mg PO PRN 10/01/14 











Decision To Admit





- Decistion To Admit


Decision to Admit Reason: Medical Nature


Decision to Admit Date: 01/10/19


Decision to Admit Time: 13:00

## 2019-01-10 NOTE — HP
SUPERVISING PHYSICIAN:  Larry Singh M.D.



CHIEF COMPLAINT:  Right upper arm pain.



HISTORY OF PRESENT ILLNESS:  This is a 43 year-old male who presented to the 
Emergency Room with right upper arm pain.  He has a significant history of 
multiple MRSA infections.  He has actually become septic several times due to 
the Staph infections.  He also has a history of thrombotic thrombocytopenic 
purpura which required multiple months of hospitalization.  He usually gets IV 
vancomycin for his Staph infection, so due to his significant history I was 
called for hospital admission.  In the Emergency Room his vital signs showed him
to be afebrile with a temperature of 98.3, pulse rate 110, blood pressure 
135/87, respiratory rate 20, O2 sat is 99% on room air.  Blood cultures were 
obtained and lab studies showed WBCs of 10.5 with hemoglobin 15.4 and hematocrit
46.2.  Chemistry showed electrolytes that are basically within normal limits.  
Lactic acid 1.3, alkaline phosphatase was 125.  He was given some fluids in the 
Emergency Room as well as given a dose of vancomycin.  He was admitted to the 
floor in stable condition. 



PAST MEDICAL HISTORY: 

1.   Hypertension.

2.   Generalized anxiety disorder.

3.   History of Methicillin resistant Staphylococcus aureus infections requiring
hospitalizations.

4.   Paroxysmal atrial fibrillation.

5.   Thrombotic thrombocytopenic purpura.

6.   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



PAST SURGICAL HISTORY:

1.   Appendectomy.

2.   Left eye removal due to Coats disease.

3.   Left shoulder arthroscopy.

4.   Left shoulder replacement.

5.   Anterior cervical discectomy.

6.   Laparoscopic incarcerated umbilical hernia repair.



OUTPATIENT MEDICATIONS:

1.   Prevacid.

2.   Alprazolam.

3.   Amlodipine.

4.   Lisinopril.



ALLERGIES:  CIPRO, SULFA, CODEINE, LYRICA AND PRAVACHOL.



FAMILY HISTORY:  Positive for coronary artery disease, hypertension, asthma, 
cerebrovascular accident, hypertension and cancer.



SOCIAL HISTORY:  He is disabled.  He previously worked in the oil field.  He is 
single.  He does not smoke tobacco but he uses smokeless tobacco and uses 1 can 
daily.  He previously used alcohol up until 2009, now he drinks socially several
drinks per week.  He says he does use marijuana but only occasionally. 



REVIEW OF SYSTEMS:  

GENERAL:  Negative for fatigue, fever or weight changes.

HEENT:  Negative for sinus symptoms, ear pain, vision changes or sore throat.

RESPIRATORY:  Negative for coughing, wheezing or shortness of breath.

CARDIOLOGY:  Negative for chest pain, palpitations or tachycardia.

GASTROINTESTINAL:  Negative for nausea, vomiting, diarrhea or constipation.

GENITOURINARY:  Negative for hematuria, dysuria or polyuria.

MUSCULOSKELETAL:  Negative for myalgias and arthralgias.

SKIN:  As per History of Present Illness.

NEUROLOGIC:  Negative for headaches, dizziness or seizures.



PHYSICAL EXAMINATION: 



VITAL SIGNS:  Temperature 98.3, pulse 110, respiratory rate 20, blood pressure 
135/87, O2 sat 99% on room air.



GENERAL:  This is a 43 year-old male patient who is lying in his hospital bed.  
He is in no acute distress.



HEENT:  Normocephalic and atraumatic.  Pupils are equal and reactive.  
Oropharynx is clear.



NECK:  Supple without mass.



RESPIRATORY:  Essentially clear to auscultation bilaterally.



CARDIOVASCULAR:  Regular rate and rhythm.



GASTROINTESTINAL:  Abdomen is soft, nondistended, non-tender.  Bowel sounds are 
positive.



EXTREMITIES:  No clubbing, cyanosis or edema.



SKIN:  There is an area of cellulitis to his right inner upper arm.  The area of
induration is approximately 10 cm in diameter.  It is warm to touch.  It is 
tender to palpation.  There is no fluctuance or drainage noted.



LABORATORY:  Labs and films are per the History of Present Illness.



ASSESSMENT: 

1.   Cellulitis of the right upper extremity with the patient's significant 
history of MRSA

      infections requiring hospitalization and frequent sepsis.

2.   Thrombotic thrombocytopenic purpura by history.

3.   Hypertension that is stable on medication.

4.   Asthma with no evidence of an acute exacerbation.

5.   General anxiety disorder.

6.   Gastroesophageal reflux disease.



PLAN:  We will admit the patient to the hospital.  He is being placed under 
observation.  I have ordered an ESR and a CRP and added it to the lab that was 
done previously.  He will be on vancomycin per pharmacy protocol.  I will 
contact Dr. Benavides tomorrow to see what she recommends on an outpatient basis for
his treatment.  I have ordered a PPI for ulcer prophylaxis as well as Lovenox 
for DVT prophylaxis.  He also has pain medication as well as fluids overnight.  
I have also ordered a tetanus shot as he is not sure when the last time he 
received a tetanus shot and he felt like it was more than 5 years ago.  I have 
ordered lab for in the morning.  Will continue to monitor him closely and follow
as needed.  Dr. Singh is the collaborating physician available for consultation.



#[id number]

[facility]

## 2019-01-11 RX ADMIN — LISINOPRIL SCH MG: 10 TABLET ORAL at 08:28

## 2019-01-11 RX ADMIN — PANTOPRAZOLE SODIUM SCH MG: 40 INJECTION, POWDER, FOR SOLUTION INTRAVENOUS at 06:20

## 2019-01-11 RX ADMIN — LISINOPRIL SCH MG: 10 TABLET ORAL at 21:06

## 2019-01-11 RX ADMIN — ALUMINUM ZIRCONIUM TRICHLOROHYDREX GLY SCH MG: 0.2 STICK TOPICAL at 21:05

## 2019-01-11 RX ADMIN — ENOXAPARIN SODIUM SCH MG: 40 INJECTION, SOLUTION INTRAVENOUS; SUBCUTANEOUS at 21:06

## 2019-01-11 RX ADMIN — HYDROCODONE BITARTRATE AND ACETAMINOPHEN PRN EA: 5; 325 TABLET ORAL at 18:37

## 2019-01-11 RX ADMIN — VANCOMYCIN HYDROCHLORIDE SCH: 500 INJECTION, POWDER, LYOPHILIZED, FOR SOLUTION INTRAVENOUS at 14:39

## 2019-01-11 RX ADMIN — SODIUM CHLORIDE AND POTASSIUM CHLORIDE PRN MLS/HR: 4.5; 1.49 INJECTION, SOLUTION INTRAVENOUS at 23:40

## 2019-01-11 RX ADMIN — HYDROCODONE BITARTRATE AND ACETAMINOPHEN PRN EA: 5; 325 TABLET ORAL at 09:17

## 2019-01-11 RX ADMIN — SODIUM CHLORIDE AND POTASSIUM CHLORIDE PRN MLS/HR: 4.5; 1.49 INJECTION, SOLUTION INTRAVENOUS at 02:19

## 2019-01-11 RX ADMIN — IPRATROPIUM BROMIDE AND ALBUTEROL SULFATE PRN ML: .5; 3 SOLUTION RESPIRATORY (INHALATION) at 12:00

## 2019-01-11 RX ADMIN — VANCOMYCIN HYDROCHLORIDE SCH MLS/HR: 500 INJECTION, POWDER, LYOPHILIZED, FOR SOLUTION INTRAVENOUS at 04:07

## 2019-01-11 RX ADMIN — SODIUM CHLORIDE AND POTASSIUM CHLORIDE PRN MLS/HR: 4.5; 1.49 INJECTION, SOLUTION INTRAVENOUS at 12:28

## 2019-01-11 RX ADMIN — VANCOMYCIN HYDROCHLORIDE SCH MLS/HR: 500 INJECTION, POWDER, LYOPHILIZED, FOR SOLUTION INTRAVENOUS at 15:09

## 2019-01-11 RX ADMIN — IPRATROPIUM BROMIDE AND ALBUTEROL SULFATE PRN ML: .5; 3 SOLUTION RESPIRATORY (INHALATION) at 21:02

## 2019-01-12 VITALS — DIASTOLIC BLOOD PRESSURE: 80 MMHG | OXYGEN SATURATION: 99 % | TEMPERATURE: 98 F | SYSTOLIC BLOOD PRESSURE: 120 MMHG

## 2019-01-12 RX ADMIN — VANCOMYCIN HYDROCHLORIDE SCH MLS/HR: 500 INJECTION, POWDER, LYOPHILIZED, FOR SOLUTION INTRAVENOUS at 03:13

## 2019-01-12 RX ADMIN — LISINOPRIL SCH MG: 10 TABLET ORAL at 08:25

## 2019-01-12 RX ADMIN — HYDROCODONE BITARTRATE AND ACETAMINOPHEN PRN EA: 5; 325 TABLET ORAL at 07:14

## 2019-01-12 RX ADMIN — HYDROCODONE BITARTRATE AND ACETAMINOPHEN PRN EA: 5; 325 TABLET ORAL at 14:56

## 2019-01-12 RX ADMIN — VANCOMYCIN HYDROCHLORIDE SCH MLS/HR: 500 INJECTION, POWDER, LYOPHILIZED, FOR SOLUTION INTRAVENOUS at 14:51

## 2019-01-12 RX ADMIN — PANTOPRAZOLE SODIUM SCH MG: 40 INJECTION, POWDER, FOR SOLUTION INTRAVENOUS at 06:22

## 2019-01-12 RX ADMIN — SODIUM CHLORIDE AND POTASSIUM CHLORIDE PRN MLS/HR: 4.5; 1.49 INJECTION, SOLUTION INTRAVENOUS at 07:07

## 2019-01-12 RX ADMIN — ALUMINUM ZIRCONIUM TRICHLOROHYDREX GLY SCH MG: 0.2 STICK TOPICAL at 08:25

## 2019-01-13 NOTE — DS
SUPERVISING PHYSICIAN:  Larry Singh M.D.



ADMISSION DIAGNOSIS:

1.   Cellulitis of the right upper extremity with the patient's significant 
history of MRSA

      infections requiring hospitalization and frequent sepsis.

2.   Thrombotic thrombocytopenic purpura by history.

3.   Hypertension that is stable on medication.

4.   Asthma with no evidence of an acute exacerbation.

5.   General anxiety disorder.

6.   Gastroesophageal reflux disease.



DISCHARGE DIAGNOSIS: 

1.   Cellulitis of the right upper extremity with a history of MRSA on 
vancomycin while

      in the hospital with continued treatment as an outpatient.

2.   History of thrombotic thrombocytopenic purpura.

3.   Hypertension showing to be stable on medication.

4.   Asthma with no exacerbation noted on admission.

5.   General anxiety disorder.

6.   Gastroesophageal reflux disease.



REASON FOR HOSPITALIZATION:  This is a 43 year-old male who presented to the 
Emergency Room with right upper arm pain.  He has a significant history of 
multiple MRSA infections.  He has actually become septic several times due to 
the Staph infections.  He also has a history of thrombotic thrombocytopenic 
purpura which required multiple months of hospitalization.  He usually gets IV 
vancomycin for his Staph infection, so due to his significant history I was 
called for hospital admission.  In the Emergency Room his vital signs showed him
to be afebrile with a temperature of 98.3, pulse rate 110, blood pressure 
135/87, respiratory rate 20, O2 sat is 99% on room air.  Blood cultures were 
obtained and lab studies showed WBCs of 10.5 with hemoglobin 15.4 and hematocrit
46.2.  Chemistry showed electrolytes that are basically within normal limits.  
Lactic acid 1.3, alkaline phosphatase was 125.  He was given some fluids in the 
Emergency Room as well as given a dose of vancomycin.  He was admitted to the 
floor in stable condition. 



LABORATORY STUDIES:  CBC on admission showed normal white count as well as 
discharge with CBC at discharge showing to be within normal limits.  He 
initially did have a left shift but this resolved prior to discharge.  He did 
have an elevated ESR at 27.  Chemistries showed normal electrolytes both on 
admission and at discharge.  Discharge BUN was 10, creatinine 0.79.  Lactic acid
on admission was 1.3, calcium 9.0 at discharge.  Magnesium 1.8 with a low 
magnesium of 1.6 with replacement prior to discharge.  Liver functions were all 
showing to be within normal limits.  He did have an elevated CRP. 



MICROBIOLOGY:  Blood cultures were showing no growth at 48 hours.  



RADIOLOGY:  He had a soft tissue ultrasound on admission and per radiology 
interpretation there was note of subcutaneous soft tissue interstitial edema in 
the area of concern without an abscess, mass or focal soft tissue lesion.



HOSPITAL COURSE:  Mr. Gillette was admitted as noted for cellulitis of the right 
upper extremity.  Initially he was started on antibiotic coverage with 
vancomycin per Pharmacy protocol.  He was given pain management as needed and 
was showing good clinical response to treatment.  It was felt that he had 
improved well enough to continue with outpatient management to followup with 
both Infectious Disease specialist, Dr. Benavides and his primary care provider, Dr. Stearns. 



PLAN:  Mr. Gillette was discharged on 01/12/19 with instructions to followup with
both Dr. Benavides and Dr. Stearns, and call their office next week to schedule his 
followup appointments.  He was to continue with outpatient therapy with 
vancomycin as directed as an outpatient and told to return to the hospital 
should any concerning symptoms.  Wound management was may shower but no tub 
baths.  Keep the area clean and dry.  Diet at discharge was diabetic diet as 
tolerated.  Activity is to increase as tolerated.  Prescriptions at discharge 
included:



1.   Align 4 mg twice daily while on the vancomycin.

2.   Vancomycin infusion per Pharmacy protocol.



DISPOSITION:  The patient was discharged to the care of family.  Condition at 
discharge was stable and improved.



#[id number]

[facility]

## 2019-01-28 ENCOUNTER — HOSPITAL ENCOUNTER (INPATIENT)
Dept: HOSPITAL 39 - ER | Age: 44
LOS: 3 days | Discharge: HOME | DRG: 603 | End: 2019-01-31
Attending: NURSE PRACTITIONER | Admitting: NURSE PRACTITIONER
Payer: MEDICARE

## 2019-01-28 DIAGNOSIS — I10: ICD-10-CM

## 2019-01-28 DIAGNOSIS — Z86.711: ICD-10-CM

## 2019-01-28 DIAGNOSIS — Z88.5: ICD-10-CM

## 2019-01-28 DIAGNOSIS — Z88.1: ICD-10-CM

## 2019-01-28 DIAGNOSIS — I48.0: ICD-10-CM

## 2019-01-28 DIAGNOSIS — Z86.73: ICD-10-CM

## 2019-01-28 DIAGNOSIS — F41.1: ICD-10-CM

## 2019-01-28 DIAGNOSIS — Z88.2: ICD-10-CM

## 2019-01-28 DIAGNOSIS — B95.62: ICD-10-CM

## 2019-01-28 DIAGNOSIS — Z86.718: ICD-10-CM

## 2019-01-28 DIAGNOSIS — L03.111: Primary | ICD-10-CM

## 2019-01-28 NOTE — ED.PDOC
History of Present Illness





- General


Chief Complaint: Upper Extremity Injury


Stated Complaint: Rt arm edema/pain


Time Seen by Provider: 19 18:53


Source: patient


Exam Limitations: no limitations





- History of Present Illness


Initial Comments: 





the patient is a 43-year-old  male presenting to the emergency room 

secondary to a recurrence of his right upper extremity pain, swelling and 

increased heat.  This has just started back since this morning.  The patient has

just finished a course of IV vancomycin 3 days ago.  He has followed with Dr. Benavides of infectious disease for chronic recurrent staph infections.  He does 

have a history of becoming rapidly septic.  He also has a history of thrombotic 

thrombocytopenic purpura.  He does not have any fever at this time.  No evidence

of any abscess formation.  He does have 5 or 6 areas of smaller than dime-sized 

excoriations which are apparently chronic.  He does have changes from his 

previous stroke.


Timing/Duration: unsure


Severity: moderate


Improving Factors: nothing


Worsening Factors: nothing


Associated Symptoms: denies symptoms


Allergies/Adverse Reactions: 


Allergies





Codeine Allergy (Unknown, Verified 18 14:50)


   


Meperidine Allergy (Unknown, Verified 18 14:50)


   


Pravastatin [From Pravachol] Allergy (Unknown, Verified 18 14:50)


   


Pregabalin [From Lyrica] Allergy (Unknown, Verified 18 14:50)


   


Ciprofloxacin [From Cipro] Allergy (Verified 01/10/18 04:15)


   


Daptomycin Allergy (Verified 01/10/18 04:15)


   


Sulfamethoxazole w/Trimethoprim [From Bactrim] Allergy (Verified 18 14:48)


   








Home Medications: 


Ambulatory Orders





ALPRAZolam [Xanax] 0.25 mg PO TID 14 


Lisinopril 20 mg PO BID 14 


amLODIPine BESYLATE [Norvasc] 5 mg PO DAILY 14 


Ibuprofen [Advil] 200 mg PO PRN 10/01/14 


Lansoprazole [Prevacid] 30 mg PO PRN 10/01/14 


Bifidobacterium Infantis [Align] 4 mg PO BID  cap 19 











Review of Systems





- Review of Systems


Constitutional: States: malaise


EENTM: States: no symptoms reported


Respiratory: States: no symptoms reported


Cardiology: States: no symptoms reported


Gastrointestinal/Abdominal: States: no symptoms reported


Genitourinary: States: no symptoms reported


Musculoskeletal: States: see HPI


Skin: States: see HPI


Neurological: States: no symptoms reported, anxiety


Endocrine: States: no symptoms reported


All other Systems: No Change from Baseline





Past Medical History (General)





- Patient Medical History


Hx Seizures: No


Hx Stroke: Yes


Hx Dementia: No


Hx Asthma: Yes


Hx of COPD: No


Hx Cardiac Disorders: No


Hx Congestive Heart Failure: No


Hx Pacemaker: No


Hx Hypertension: Yes


Hx Thyroid Disease: No


Hx Diabetes: No


Hx Gastroesophageal Reflux: Yes


Hx Renal Disease: No


Hx Cancer: Yes - ITP


Hx of HIV: No


Hx Hepatitis C: No


Hx MRSA: No


MRSA Source:: Wound


Surgical History: appendectomy





- Vaccination History


Hx Tetanus, Diphtheria Vaccination: Yes


Hx Influenza Vaccination: No


Hx Pneumococcal Vaccination: No





- Social History


Hx Tobacco Use: Yes


Hx Chewing Tobacco Use: No


Hx Alcohol Use: No


Hx Substance Use: No


Hx Substance Use Treatment: No


Hx Depression: No


Hx Physical Abuse: No


Hx Emotional Abuse: No


Hx Suspected Abuse: No





Family Medical History





- Family History


  ** Father


Family History: Unknown


Living Status: 


Hx Family Asthma: No


Hx Family Congestive Heart Failure: No


Hx Family Hypertension: No


Hx Family Stroke: No


Hx Cardiac Disease: No


Hx Family Diabetes: No


Hx Family Cancer: No


Hx Family;Other: MI





  ** Mother


Family History: Unknown


Living Status: Still Living


Hx Family Asthma: No


Hx Family Congestive Heart Failure: No


Hx Family Hypertension: No


Hx Family Stroke: No


Hx Cardiac Disease: No


Hx Family Diabetes: No


Hx Family Cancer: Yes - Breast cancer





Physical Exam





- Physical Exam


General Appearance: Alert, Anxious, No apparent distress


Eye Exam: right normal, left other - chronic abnormality


Ears, Nose, Throat: hearing grossly normal, normal pharynx


Neck: full range of motion, supple


Respiratory: lungs clear, normal breath sounds, no respiratory distress, no 

accessory muscle use - port is present in the left upper chest wall


Cardiovascular/Chest: normal peripheral pulses, regular rate, rhythm, no edema


Peripheral Pulses: radial,right: 2+, radial,left: 2+, dorsalis pedis,right: 2+, 

dorsalis pedis,left: 2+


Gastrointestinal/Abdominal: non tender, soft


Rectal Exam: deferred


Extremity: normal range of motion, no pedal edema, no calf tenderness, normal 

capillary refill, swelling, other - he does have some swelling and increased 

heat to the right forearm.  There are chronic areas of excoriation.


Neurologic: CNs II-XII nml as tested, alert, normal mood/affect, oriented x 3


Skin Exam: rash - see above


Comments: 





                               Vital Signs - 24 hr











  19





  19:13 19:58 20:41


 


Temperature 97.3 F L  


 


Pulse Rate [ 97 H 83 90





Right]   


 


Respiratory 16 16 16





Rate   


 


Blood Pressure 162/100 122/89 128/77





[Left Arm]   


 


O2 Sat by Pulse 99 97 95





Oximetry   














Progress





- Progress


Progress: 





19 20:44


the patient is a 43-year-old  male presenting to the emergency room 

secondary to recurrence of his chronic recurrent right upper extremity staph 

infection.  He is being restarted on vancomycin.  He'll be placed in house 

overnight and a call to be placed to his infectious disease doctor in the 

morning for a further plan.  No evidence of overt sepsis at this time.  Blood 

culture has been performed.  Laboratory work is reassuring.





- Results/Orders


Results/Orders: 





                                Laboratory Tests











  19





  19:37 19:37


 


WBC  5.7 


 


RBC  4.72 


 


Hgb  13.9 L 


 


Hct  41.9 L 


 


MCV  88.7 


 


MCH  29.4 


 


MCHC  33.3 


 


RDW  12.9 


 


Plt Count  206 


 


MPV  8.2 


 


Absolute Neuts (auto)  2.90 


 


Absolute Lymphs (auto)  2.00 


 


Absolute Monos (auto)  0.50 


 


Absolute Eos (auto)  0.30 


 


Absolute Basos (auto)  0.10 


 


Neutrophils %  50.4 


 


Lymphocytes %  34.8 


 


Monocytes %  9.2 H 


 


Eosinophils %  4.7 


 


Basophils %  0.9 


 


Sodium   137


 


Potassium   3.5 L


 


Chloride   103


 


Carbon Dioxide   27


 


Anion Gap   10.5 L


 


BUN   17


 


Creatinine   0.66


 


BUN/Creatinine Ratio   25.8 H


 


Random Glucose   91


 


Serum Osmolality   274.9 L


 


Calcium   9.3


 


Total Bilirubin   0.5


 


AST   23


 


ALT   27


 


Alkaline Phosphatase   87


 


Serum Total Protein   8.2


 


Albumin   4.2


 


Globulin   4.0 H


 


Albumin/Globulin Ratio   1.1














Departure





- Departure


Clinical Impression: 


 Recurrent cellulitis





Disposition: Admit Patient


Departure Forms:  ED Discharge - Pt. Copy, Patient Portal Self Enrollment


Referrals: 


Aldo Stearns III, MD [Primary Care Provider] - 1-2 Weeks


Home Medications: 


Ambulatory Orders





ALPRAZolam [Xanax] 0.25 mg PO TID 14 


Lisinopril 20 mg PO BID 14 


amLODIPine BESYLATE [Norvasc] 5 mg PO DAILY 14 


Ibuprofen [Advil] 200 mg PO PRN 10/01/14 


Lansoprazole [Prevacid] 30 mg PO PRN 10/01/14 


Bifidobacterium Infantis [Align] 4 mg PO BID  cap 19 











Decision To Admit





- Decistion To Admit


Decision to Admit Reason: Medical Nature


Decision to Admit Date: 19


Decision to Admit Time: 20:45

## 2019-01-28 NOTE — HP
SUPERVISING PHYSICIAN:  Larry Singh MD



CHIEF COMPLAINT: Right upper extremity pain, swelling and increased heat.



HISTORY OF PRESENT ILLNESS:  This is a 43-year-old male patient who went to the 
Emergency Room due to right upper extremity pain, swelling and increased heat.  
The patient was just discharged from the hospital earlier this month and was 
participating in outpatient IV vancomycin therapy for recurrent MRSA infection. 
The patient has a history of multiple episodes of this and Dr. Benavides has been 
managing.  However, he tells me he did not see her in followup, but did speak 
with her on the phone and also did not see Dr. Stearns in followup after his 
discharge.  He also has a history of TTP and stroke in the past.  In the 
Emergency Room, his labs show normal white count at 5.7, hemoglobin 13.9, 
hematocrit 41.9, platelet count 206.  Chemistry shows sodium 137, potassium 3.5,
chloride 103, CO2 27, BUN 17, creatinine 0.66.  Glucose 91, calcium 9.3.  Due to
his recurrent cellulitis and need for vancomycin, he has been referred for 
admission.  At the time of examination, the patient is sleepy, but awakens and 
answers questions without difficulty.  His right arm shows several excoriations 
as well as edema and erythema.  It is warm to the touch as well.  He has 2 grams
of vancomycin infusing currently.  



PAST MEDICAL HISTORY: 

1.   Hypertension.

2.   Generalized anxiety disorder.

3.   History of Methicillin resistant Staphylococcus aureus infections requiring
hospitalizations and vancomycin infusion.

4.   Paroxysmal atrial fibrillation.

5.   Thrombotic thrombocytopenic purpura.

6.   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

7.   Pulmonary embolism in the past.

8.   Deep venous thrombosis in the left upper extremity in the past.



PAST SURGICAL HISTORY:

1.   Appendectomy.

2.   Left eye removal due to Coats disease.

3.   Left shoulder arthroscopy.

4.   Left shoulder replacement.

5.   Anterior cervical discectomy.

6.   Laparoscopic incarcerated umbilical hernia repair.



CURRENT MEDICATIONS:  Please see medication reconciliation list once verified.



ALLERGIES:  CIPRO, SULFA, CODEINE, LYRICA AND PRAVACHOL.



FAMILY HISTORY: Coronary artery disease, hypertension, asthma, cerebrovascular 
accident, cancer. 



SOCIAL HISTORY:  The patient is disabled.  He used to work in the oil field.  He
uses smokeless alcohol, social alcohol use, occasional marijuana use.



REVIEW OF SYSTEMS: 

CONSTITUTIONAL: No fever or chills.  No recent weight loss or weight gain.  

HEENT:  No headaches, vision changes, ear pain, nasal congestion or throat pain.
 

RESPIRATORY:  No cough, hemoptysis or pleuritic chest pain.

CARDIOVASCULAR:  No chest pain, palpitations or peripheral edema.

GASTROINTESTINAL:  No nausea, vomiting, diarrhea, constipation or abdominal 
pain.

GENITOURINARY:  No dysuria, frequency or flank pain.

HEMATOLOGIC:  Positive for TTP, easy bruising.  No transfusion reaction.  

MUSCULOSKELETAL:  No muscle cramps, joint pain or joint swelling.

SKIN:  Right arm cellulitis as described in the history of present illness.

ENDOCRINE:  No polydipsia, polyuria or polyphagia.  No heat or cold intolerance.

NEUROLOGIC:  No headaches, dizziness paresthesias or seizures.  



PHYSICAL EXAMINATION: 



VITAL SIGNS:  Blood pressure 128/77.  Heart rate 90.  Respiratory rate 16.  
Temperature 97.3.  Oxygen saturation 95%.



GENERAL:  Mr. Gillette is a 43-year-old male patient in moderate distress 
currently.



NEUROLOGIC:  The patient is alert and oriented.



CHEST:  Lung sounds are clear to auscultation bilaterally.  



CARDIOVASCULAR:  Regular rate and rhythm.  Normal S1, S2.



ABDOMEN:  Soft.  Positive bowel sounds.  



GENITOURINARY:  Deferred.



EXTREMITIES:  Lower extremities with no edema.  Pulses 2+.  Capillary refill is 
less than 2 seconds.  Right upper extremity with erythema, some edema and some 
warmth to the touch.  He has several excoriated areas as well.



LABORATORY: Labs are as discussed in history of present illness.



ASSESSMENT:

1.  Right upper extremity cellulitis which is likely recurrent methicillin-
resistant

     Staphylococcus aureus.

2.  History of thrombotic thrombocytopenic purpura with no evidence of an acute

     exacerbation.

3.  History of deep venous thrombosis in the left upper extremity with a history
of

     pulmonary embolism as well.  I do not see that he is on any anticoagulation

     therapy for this.  

4.  Hypertension, controlled.



PLAN:  At this time, he is getting 2 grams of vancomycin and I will continue 
this per pharmacy protocol.  I plan to speak with Dr. Benavides tomorrow regarding  
his recurrent issues and may need to actually change up the antibiotics to 
daptomycin.  I am also going to Doppler his upper extremity to make sure he does
not have a deep venous thrombosis.  We will resume his home medications once 
they are verified in the computer.



#[id number]

[facility]

## 2019-01-29 RX ADMIN — VANCOMYCIN HYDROCHLORIDE SCH MLS/HR: 500 INJECTION, POWDER, LYOPHILIZED, FOR SOLUTION INTRAVENOUS at 22:13

## 2019-01-29 RX ADMIN — ALUMINUM ZIRCONIUM TRICHLOROHYDREX GLY SCH MG: 0.2 STICK TOPICAL at 08:32

## 2019-01-29 RX ADMIN — POTASSIUM CHLORIDE AND SODIUM CHLORIDE PRN MLS/HR: 900; 150 INJECTION, SOLUTION INTRAVENOUS at 00:21

## 2019-01-29 RX ADMIN — VANCOMYCIN HYDROCHLORIDE SCH MLS/HR: 500 INJECTION, POWDER, LYOPHILIZED, FOR SOLUTION INTRAVENOUS at 10:37

## 2019-01-29 RX ADMIN — Medication PRN ML: at 00:33

## 2019-01-29 RX ADMIN — IBUPROFEN PRN MG: 200 TABLET, FILM COATED ORAL at 20:09

## 2019-01-29 RX ADMIN — POTASSIUM CHLORIDE AND SODIUM CHLORIDE PRN MLS/HR: 900; 150 INJECTION, SOLUTION INTRAVENOUS at 10:05

## 2019-01-29 RX ADMIN — ENOXAPARIN SODIUM SCH MG: 40 INJECTION, SOLUTION INTRAVENOUS; SUBCUTANEOUS at 16:03

## 2019-01-29 RX ADMIN — OMEPRAZOLE SCH MG: 20 CAPSULE, DELAYED RELEASE ORAL at 06:23

## 2019-01-29 RX ADMIN — Medication PRN ML: at 22:14

## 2019-01-29 RX ADMIN — LISINOPRIL SCH MG: 10 TABLET ORAL at 20:31

## 2019-01-29 RX ADMIN — IBUPROFEN PRN MG: 200 TABLET, FILM COATED ORAL at 04:44

## 2019-01-29 RX ADMIN — LISINOPRIL SCH MG: 10 TABLET ORAL at 10:36

## 2019-01-29 RX ADMIN — ALUMINUM ZIRCONIUM TRICHLOROHYDREX GLY SCH MG: 0.2 STICK TOPICAL at 20:31

## 2019-01-29 NOTE — US
EXAM DESCRIPTION: 



Venous Doppler sonogram right upper extremity



CLINICAL HISTORY: 



Cellulitis. Right arm swelling. History of deep vein thrombosis 



COMPARISON: 



[None Available.]





TECHNIQUE: 



Venous Doppler right upper extremity deep venous system

subclavian vein through the forearm veins proximally



FINDINGS: 



Normal venous flow with color Doppler. Normal respiratory

variability. Normal compressibility. Normal augmentation of flow

with compression maneuvers



IMPRESSION: 



No deep vein thrombosis right upper extremity





Electronically signed by:  Jese Fournier MD  1/29/2019 12:06

PM CST Workstation: [id number]

## 2019-01-29 NOTE — PN
SUPERVISING PHYSICIAN: Larry Singh MD



DATE:  01/29/19



SUBJECTIVE:  The patient states he feels like his arm looks better, but he also 
has some new spots on his left hand.  There is a little bit of erythema around 
those, but not really that bad.  He does not complain of any pain, nausea, 
vomiting or fever overnight at this point.



OBJECTIVE:  

VITAL SIGNS:  Blood pressure 105/63.  Heart rate 77.  Respiratory rate 18.  
Temperature 97.9.  Oxygen saturation 99%.

GENERAL:  Mr. Gillette is a 43-year-old male patient in no active distress.

NEUROLOGIC:  Alert and oriented.

LUNGS:  Clear to auscultation bilaterally.

CARDIOVASCULAR:  Regular rate and rhythm.  Normal S1, S2.

ABDOMEN:  Soft.  Positive bowel sounds.  

EXTREMITIES: The right upper extremity with improved erythema and edema.  The 
cellulitis was marked last night and the erythema is receding from the marked 
areas.  



LABORATORY:  Normal white count.  Chemistry is unremarkable.  



ASSESSMENT: 

1.  Right upper extremity cellulitis, which is likely methicillin-resistant 
Staphylococcus

     aureus.

2.  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

3.  History of deep venous thrombosis in the left upper extremity with a history
of

     pulmonary embolism as well.  The Doppler is still pending.

4.  Hypertension, controlled.



PLAN:  He has had some interval improvement with the vancomycin.  I did speak 
with Dr. Benavides who suggested doxycycline, however, if the cellulitis was 
significant, then to continue the vancomycin.  I think at this point the 
cellulitis was too much for doxycycline alone to be effective, so I will 
continue the vancomycin for now.  Once he is improved, we will send him home on 
doxycycline with followup appointment scheduled although he did not attend his 
followup appointment after last admission.  



#[id number]

[facility]

## 2019-01-30 RX ADMIN — IBUPROFEN PRN MG: 200 TABLET, FILM COATED ORAL at 00:39

## 2019-01-30 RX ADMIN — ENOXAPARIN SODIUM SCH MG: 40 INJECTION, SOLUTION INTRAVENOUS; SUBCUTANEOUS at 12:12

## 2019-01-30 RX ADMIN — ALUMINUM ZIRCONIUM TRICHLOROHYDREX GLY SCH MG: 0.2 STICK TOPICAL at 21:16

## 2019-01-30 RX ADMIN — Medication PRN ML: at 09:58

## 2019-01-30 RX ADMIN — VANCOMYCIN HYDROCHLORIDE SCH MLS/HR: 500 INJECTION, POWDER, LYOPHILIZED, FOR SOLUTION INTRAVENOUS at 09:57

## 2019-01-30 RX ADMIN — Medication PRN ML: at 21:59

## 2019-01-30 RX ADMIN — IBUPROFEN PRN MG: 200 TABLET, FILM COATED ORAL at 07:38

## 2019-01-30 RX ADMIN — ALUMINUM ZIRCONIUM TRICHLOROHYDREX GLY SCH MG: 0.2 STICK TOPICAL at 08:37

## 2019-01-30 RX ADMIN — CHLORHEXIDINE GLUCONATE SCH ML: 213 SOLUTION TOPICAL at 09:57

## 2019-01-30 RX ADMIN — LISINOPRIL SCH MG: 10 TABLET ORAL at 21:16

## 2019-01-30 RX ADMIN — LISINOPRIL SCH MG: 10 TABLET ORAL at 08:37

## 2019-01-30 RX ADMIN — OMEPRAZOLE SCH MG: 20 CAPSULE, DELAYED RELEASE ORAL at 05:51

## 2019-01-30 RX ADMIN — VANCOMYCIN HYDROCHLORIDE SCH MLS/HR: 500 INJECTION, POWDER, LYOPHILIZED, FOR SOLUTION INTRAVENOUS at 21:58

## 2019-01-30 NOTE — PN
SUPERVISING PHYSICIAN:  Larry Singh MD



DATE:  01/30/19 



SUBJECTIVE:  The patient is sitting up in bed watching television.  He has no 
complaints of shortness of breath, nausea, vomiting, diarrhea or constipation.  
He does complain that his right arm feels like "a truck is sitting on it," but 
he does say the pain is slightly better.  He did say that the Motrin helped very
little.  He wanted to know if he could have something stronger and he was okay 
with tramadol.  I also discussed that he would continue on an outpatient basis 
with his vancomycin until he saw Dr. Benavides on 02/12/19 and he was okay with 
that.



OBJECTIVE:  

VITAL SIGNS:  Temperature 97.9.  Hear rate 93.  Blood pressure 130/81.  
Respiratory rate 18. O2 saturation 99% on room air.  

RESPIRATORY:  Essentially clear to auscultation bilaterally.  

CARDIAC: Regular rate and rhythm.  

GASTROINTESTINAL: Abdomen is soft, nondistended, nontender.  Bowel sounds are 
positive.  

EXTREMITIES:  His right upper arm has improved and is only very slightly 
erythematous with +1 to 2 edema.  There are marks on his arm and it has improved
from the marked areas.  

NEUROLOGIC: Awake, alert and oriented times three.  



LABORATORY:  Preliminary blood cultures show no growth after 24 hours.  Upper 
extremity ultrasound report shows no DVT in the right upper extremity.  



ASSESSMENT: 

1.  Right upper extremity cellulitis, which is likely methicillin-resistant 
Staphylococcus

     aureus.

2.  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

3.  History of deep venous thrombosis in the left upper extremity with a history
of

     pulmonary embolism as well.  The Doppler on his right arm from yesterday is

     negative.

4.  Hypertension.



PLAN:  We will continue present supportive care.  He will most likely go home 
tomorrow and continue with outpatient antibiotic treatment of vancomycin.  He 
did miss his last followup appointment with Dr. Benavides and she would like him to 
continue with the outpatient vancomycin until she sees him in clinic on 02/12/19
at 10:45.  At that time, she will decide if he needs to continue with that or 
can go on oral antibiotics.  Otherwise, I will hold on lab for now, but 
hopefully we can discontinue tomorrow.  I will give him tramadol 50 to 100 mg 
q.6h. for pain.



#[id number]

[facility]

## 2019-01-31 VITALS — DIASTOLIC BLOOD PRESSURE: 70 MMHG | TEMPERATURE: 97.5 F | SYSTOLIC BLOOD PRESSURE: 128 MMHG

## 2019-01-31 VITALS — OXYGEN SATURATION: 99 %

## 2019-01-31 RX ADMIN — ENOXAPARIN SODIUM SCH: 40 INJECTION, SOLUTION INTRAVENOUS; SUBCUTANEOUS at 12:07

## 2019-01-31 RX ADMIN — OMEPRAZOLE SCH MG: 20 CAPSULE, DELAYED RELEASE ORAL at 06:06

## 2019-01-31 RX ADMIN — IBUPROFEN PRN MG: 200 TABLET, FILM COATED ORAL at 06:06

## 2019-01-31 RX ADMIN — ALUMINUM ZIRCONIUM TRICHLOROHYDREX GLY SCH MG: 0.2 STICK TOPICAL at 08:01

## 2019-01-31 RX ADMIN — LISINOPRIL SCH MG: 10 TABLET ORAL at 08:01

## 2019-01-31 RX ADMIN — VANCOMYCIN HYDROCHLORIDE SCH MLS/HR: 500 INJECTION, POWDER, LYOPHILIZED, FOR SOLUTION INTRAVENOUS at 11:25

## 2019-01-31 RX ADMIN — CHLORHEXIDINE GLUCONATE SCH ML: 213 SOLUTION TOPICAL at 08:01

## 2019-01-31 NOTE — DS
SUPERVISING PHYSICIAN:  Larry Singh MD



DISCHARGE DIAGNOSIS: 

1.  Right upper extremity cellulitis, which is likely methicillin-resistant 
Staphylococcus

     aureus.

2.  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

3.  History of deep venous thrombosis in the left upper extremity with prior 
history of

     pulmonary embolism as well.  The Doppler on his right arm from this 
hospital

     visit was negative for deep venous thrombosis.

4.  Hypertension.



HISTORY OF PRESENT ILLNESS:  This is a 43-year-old male patient who came to the 
Emergency Room on the date of admission with right upper extremity pain, 
swelling and increased heat.  The patient had been just discharged from the 
hospital earlier this month and was participating in outpatient IV vancomycin 
therapy for recurrent MRSA infection.  The patient has a history of multiple 
episodes of this and Dr. Benavides has been managing his antibiotics.  He did not 
see her in followup, but did speak with her on the phone and also did not 
followup Dr. Stearns on discharge.  He also has a history of TTP and stroke in the
past.  In the Emergency Room, his labs show normal white count at 5.7, 
hemoglobin 13.9, hematocrit 41.9, platelet count 206.  Chemistry shows sodium 
137, potassium 3.5, chloride 103, CO2 27, BUN 17, creatinine 0.66.  Glucose 91, 
calcium 9.3.  Due to his recurrent cellulitis and need for vancomycin, he was 
referred for admission.  His right arm shows several excoriations as well as 
edema and erythema.  It is warm to the touch as well.  He was started on 2 grams
of vancomycin.



HOSPITAL COURSE:  Over the next two days, he received vancomycin per pharmacy 
protocol.  Dr. Benavides was contacted and due to his recurrent issues, she thought 
he at some point may need to be changed to daptomycin.  A Doppler of his right 
upper extremity showed that he had no evidence of deep venous thrombosis.  His 
home medications were resumed.  The arm improved over the next 24 hours.  Again,
Dr. Benavides was contacted and she suggested at some point, he may be able to be 
transitioned to doxycycline, but due to the extensive nature of his cellulitis, 
he was continued on the vancomycin.  I then called Dr. Benavides's office yesterday 
and we felt it would be judicious for him to continue with the vancomycin on an 
outpatient basis for the next 2 weeks and to followup with Dr. Benavides in clinic 
to monitor his progress and for further management of his antibiotics.  He is to
continue the vancomycin on an outpatient basis until he sees Dr. Benavides on 
02/12/19 at 9:45 AM.  



LABORATORY:  His CBC this morning was basically within normal limits.  His CMP 
was basically unremarkable.  He will be discharged home to continue his 
outpatient vancomycin therapy until he sees. Dr. Benavides in clinic.



DISCHARGE PLAN:  The patient will be discharged home in stable condition.  He is
to continue with outpatient vancomycin twice daily as per pharmacy protocol.  He
is to have a weekly CBC, CMP, ESR and CRP and called to Dr. Benavides's office.  If 
he has any problems or complications, he is to call Dr. Stearns' office, Dr. Benavides's office or return to the Emergency Room.  



DISCHARGE MEDICATIONS:

1.  Lisinopril.

2.  Amlodipine.

3.  Xanax.

4.  Prevacid.

5.  Ibuprofen.

6.  Align.

7.  Vancomycin.



#[id number]

[facility]

## 2022-10-14 NOTE — US
EXAM DESCRIPTION: 



Venous,Lower Extremity RT



CLINICAL HISTORY: 



PE, find source of embolism



COMPARISON: 



None Available.



TECHNIQUE: 



Right lower extremity venous grayscale, spectral, and color

Doppler sonographic images. 



FINDINGS: 



There is no DVT identified.

There is normal color flow observed with good flow augmentation.

All deep veins compress normally.





IMPRESSION: 



Negative for DVT



Electronically signed by:  Chapo Irene MD  1/10/2018 3:39 PM CST

Workstation: [id number] [[email or web address]] : with trazadone at night 1 pepcid and 1 aleve\par if not better 2 aleve\par